# Patient Record
Sex: FEMALE | Race: OTHER | HISPANIC OR LATINO
[De-identification: names, ages, dates, MRNs, and addresses within clinical notes are randomized per-mention and may not be internally consistent; named-entity substitution may affect disease eponyms.]

---

## 2017-03-20 ENCOUNTER — APPOINTMENT (OUTPATIENT)
Dept: ORTHOPEDIC SURGERY | Facility: CLINIC | Age: 57
End: 2017-03-20

## 2017-03-20 PROBLEM — M25.511 BILATERAL SHOULDER PAIN: Status: ACTIVE | Noted: 2017-03-20

## 2017-03-27 ENCOUNTER — APPOINTMENT (OUTPATIENT)
Dept: ORTHOPEDIC SURGERY | Facility: CLINIC | Age: 57
End: 2017-03-27

## 2017-03-27 DIAGNOSIS — M25.511 PAIN IN RIGHT SHOULDER: ICD-10-CM

## 2017-03-27 DIAGNOSIS — M25.512 PAIN IN RIGHT SHOULDER: ICD-10-CM

## 2019-08-14 VITALS
BODY MASS INDEX: 28.12 KG/M2 | DIASTOLIC BLOOD PRESSURE: 68 MMHG | TEMPERATURE: 98.6 F | WEIGHT: 144 LBS | OXYGEN SATURATION: 97 % | RESPIRATION RATE: 16 BRPM | HEART RATE: 64 BPM | SYSTOLIC BLOOD PRESSURE: 132 MMHG

## 2020-12-21 ENCOUNTER — EMERGENCY (EMERGENCY)
Facility: HOSPITAL | Age: 60
LOS: 1 days | Discharge: HOME | End: 2020-12-21
Attending: STUDENT IN AN ORGANIZED HEALTH CARE EDUCATION/TRAINING PROGRAM
Payer: MEDICARE

## 2020-12-21 VITALS
HEIGHT: 61.5 IN | OXYGEN SATURATION: 95 % | HEART RATE: 69 BPM | DIASTOLIC BLOOD PRESSURE: 65 MMHG | TEMPERATURE: 98 F | SYSTOLIC BLOOD PRESSURE: 120 MMHG | RESPIRATION RATE: 18 BRPM

## 2020-12-21 DIAGNOSIS — Z98.89 OTHER SPECIFIED POSTPROCEDURAL STATES: Chronic | ICD-10-CM

## 2020-12-21 DIAGNOSIS — Z96.659 PRESENCE OF UNSPECIFIED ARTIFICIAL KNEE JOINT: Chronic | ICD-10-CM

## 2020-12-21 PROCEDURE — 70450 CT HEAD/BRAIN W/O DYE: CPT

## 2020-12-21 PROCEDURE — 90471 IMMUNIZATION ADMIN: CPT

## 2020-12-21 PROCEDURE — 12011 RPR F/E/E/N/L/M 2.5 CM/<: CPT

## 2020-12-21 PROCEDURE — 90715 TDAP VACCINE 7 YRS/> IM: CPT

## 2020-12-21 PROCEDURE — 99284 EMERGENCY DEPT VISIT MOD MDM: CPT | Mod: 25

## 2020-12-21 PROCEDURE — 70450 CT HEAD/BRAIN W/O DYE: CPT | Mod: 26

## 2020-12-21 RX ORDER — ACETAMINOPHEN 500 MG
650 TABLET ORAL ONCE
Refills: 0 | Status: COMPLETED | OUTPATIENT
Start: 2020-12-21 | End: 2020-12-21

## 2020-12-21 RX ORDER — TETANUS TOXOID, REDUCED DIPHTHERIA TOXOID AND ACELLULAR PERTUSSIS VACCINE, ADSORBED 5; 2.5; 8; 8; 2.5 [IU]/.5ML; [IU]/.5ML; UG/.5ML; UG/.5ML; UG/.5ML
0.5 SUSPENSION INTRAMUSCULAR ONCE
Refills: 0 | Status: COMPLETED | OUTPATIENT
Start: 2020-12-21 | End: 2020-12-21

## 2020-12-21 RX ADMIN — TETANUS TOXOID, REDUCED DIPHTHERIA TOXOID AND ACELLULAR PERTUSSIS VACCINE, ADSORBED 0.5 MILLILITER(S): 5; 2.5; 8; 8; 2.5 SUSPENSION INTRAMUSCULAR at 00:54

## 2020-12-21 RX ADMIN — Medication 650 MILLIGRAM(S): at 00:54

## 2020-12-21 NOTE — ED PROVIDER NOTE - PMH
Depression    High cholesterol    Hypertension    Migraines    RA (rheumatoid arthritis)    Reflux

## 2020-12-21 NOTE — ED ADULT TRIAGE NOTE - CHIEF COMPLAINT QUOTE
PT BIBA for mechanical fall. PT states fell down one step hit head, Small abrasion above left eye. Bleeding controlled. NO LOC no blood thinners. MD Rutherford called to evaluate

## 2020-12-21 NOTE — ED PROVIDER NOTE - OBJECTIVE STATEMENT
59yo female with pmh of HTN, HLD, migraines, RA presents s/p mechanical fall. Pt states she was walking down the stairs, missed the last step by mistake and fell onto her knees and hit her head, no loc. Pt states occurred at 8pm, she waited for her daugther to get home to evaluate her and that is when EMS was called. Pt denies any preceding symptoms to fall. Pt denies fevers/chills, loc, focal neuro deficits, cp/sob/palp, cough, abd pain/n/v/d, urinary symptoms, recent travel and sick contacts. Pt not on asa or AC. 61yo female with pmh of HTN, HLD, migraines, RA presents s/p mechanical fall. Pt states she was walking down the stairs, missed the last step by mistake and fell onto her knees and hit her head, no loc. Pt states occurred at 8pm, she waited for her daugther to get home to evaluate her and that is when EMS was called. Pt denies any preceding symptoms to fall. Pt denies fevers/chills, loc, focal neuro deficits, cp/sob/palp, cough, abd pain/n/v/d, urinary symptoms, recent travel and sick contacts. Pt not on asa or AC. tetanus not uptodate

## 2020-12-21 NOTE — ED ADULT NURSE NOTE - OBJECTIVE STATEMENT
pt arrived s/p fall at [facility]. fell forward down 1 step hitting head. small left forehead lac noted. bleeding controlled with gauze. denies LOC, denies blood thinner use. pt is aao x4. moving all extremities with equal strength, respirations even and unlabored. skin color wnl warm and dry. c/o headache and some dizziness. denies nausea, vomiting. denies injuries or pain any where else. no obvious deformities noted.

## 2020-12-21 NOTE — ED PROVIDER NOTE - NSFOLLOWUPINSTRUCTIONS_ED_ALL_ED_FT
Lesión en la joy, en adultos  Head Injury, Adult    Hay muchos tipos de lesiones en la joy. Pueden ser tan leves radha un chichón o pueden ser más graves. Algunas de las lesiones graves en la joy son:    Lesión que provoque un impacto en el cerebro (conmoción cerebral).  Hematoma en el cerebro (contusión). [place] significa que hay hemorragia en el cerebro que puede causar un edema.  Fisura en el cráneo (fractura de cráneo).  Hemorragia en el cerebro que se acumula, se coagula y forma ajit protuberancia (hematoma).    Después de ajit lesión en la joy, es posible que deba estar en observación marleny un tiempo en el servicio de emergencias. También puede ser necesario hospitalizarlo.    Después de que ocurre ajit lesión en la joy, la mayoría de los problemas ocurre dentro de las primeras 24 horas, aixa los efectos secundarios pueden aparecer entre 7 y 10 días después de la lesión. Es importante controlar martins afección para martir si hay cambios.     ¿Cuáles son las causas?  Hay muchas causas posibles de ajit lesión en la joy. Ajit lesión grave en la joy puede ocurrirle a alguien que tuvo un accidente automovilístico (colisión en un vehículo de motor). Otras causas de lesiones importantes en la joy incluyen accidentes en bicicleta o motocicleta, lesiones deportivas y caídas.    Factores de riesgo  Es más probable que esta afección se manifiesta en personas que:    Beben mucho alcohol o usan drogas ilícitas.  Tienen más de 65 años de edad.  Están en riesgo de sufrir caídas.    ¿Cuáles son los síntomas?  Hay muchos síntomas posibles de ajit lesión en la joy. Los síntomas visibles incluyen un simon un chichón o ajit hemorragia en el lugar de la lesión. Otros síntomas no visibles incluyen:    Somnolencia o no poder mantenerse despierto.  Desmayo.  Dolor de joy.  Convulsiones.  Mareos.  Confusión.  Problemas de memoria.  Náuseas o vómitos.    Otros síntomas posibles que pueden aparecer después de la lesión en la joy incluyen los siguientes:    Falta de atención y concentración.  Fatiga o cansarse fácilmente.  Irritabilidad.  Sentir incomodidad cerca de luces brillantes o ruidos laura.  Ansiedad o depresión.  Trastornos del sueño.    ¿Cómo se diagnostica?  Esta afección suele diagnosticarse en función de los síntomas, de ajit descripción de la lesión y de un examen físico. También pueden hacerle estudios de diagnóstico por imágenes, radha ajit resonancia magnética (RM) o ajit exploración por tomografía computarizada (TC). Lo controlarán estrictamente.    ¿Cómo se trata?  El tratamiento de esta afección depende de la gravedad y el tipo de lesión que sufrió. El objetivo principal del tratamiento es prevenir complicaciones y darle tiempo al cerebro para que se recupere.    En el silvia de ajit lesión leve en la joy, es posible que lo envíen a casa, y el tratamiento puede incluir lo siguiente:    Observación. Un adulto responsable debe quedarse con usted marleny 24 horas después de producida la lesión y controlarlo con frecuencia.  [place] físico.  [place] cerebral.  Analgésicos.    En el silvia de ajit lesión grave en la joy, el tratamiento puede incluir lo siguiente:    Observación minuciosa. [place] incluye hospitalización con exámenes físicos frecuentes. Puede necesitar ir a un hospital que se especialice en lesiones en la joy.  Analgésicos.  Asistencia respiratoria. [place] puede incluir un respirador.  Control de la presión dentro del cerebro (presión intracraneal o PIC). Estos pueden incluir los siguientes:  Monitoreo de la PIC.  Administrar medicamentos para disminuir la PIC.  Cambiar martins posición para disminuir la PIC.  Medicamentos para prevenir convulsiones.  Cirugía para detener la hemorragia o extraer coágulos de abdulkadir (craneotomía).  Cirugía para extraer parte del cráneo (craneotomía descompresiva). [place] permite que el cerebro tenga lugar para hincharse.    Siga estas indicaciones en martins casa:  Actividad    Descanse todo lo posible y evite actividades que sukhi físicamente difíciles o agotadoras.  Asegúrese de dormir lo suficiente.  Limite las actividades que requieran pensar mucho o prestar atención, radha las siguientes:  Mirar televisión.  Jugar juegos de memoria y armar rompecabezas.  Tareas para el hogar o trabajos relacionados con el empleo.  Trabajar en la computadora, participar en redes sociales y enviar mensajes de texto.  Evite actividades que puedan causar otra lesión en la joy, radha practicar deportes, hasta que el médico lo autorice. Puede ser peligroso tener otra lesión en la joy antes de que se haya recuperado de la primera.  Pregúntele al médico cuándo puede retomar saad actividades habituales, incluidos el trabajo o el estudio. Pídale al médico un plan escalonado para retomar saad actividades de forma gradual.  Consulte a martins médico sobre cuándo puede conducir, andar en bicicleta o usar maquinaria pesada. La capacidad para reaccionar puede ser más lenta luego de ajit lesión cerebral. Nunca realice estas actividades si se siente mareado.    Estilo de iliana    No xochitl alcohol hasta que el médico lo autorice y evite el consumo de drogas. El alcohol y ciertas drogas pueden demorar martins recuperación y ponerlo en riesgo de nuevas lesiones.  Si le resulta más difícil que lo habitual recordar las cosas, escríbalas.  Trate de hacer ajit cosa por vez si se distrae con facilidad.  Consulte con familiares y amigos si debe lucy decisiones importantes.  Cuénteles sobre martins lesión, los síntomas y las restricciones a saad amigos, a martins roni, a un colega de confianza y a martins gerente en el trabajo. Pídales que observen si aparecen nuevos problemas o empeoran los existentes.    Instrucciones generales    Racetrack los medicamentos de venta lele y los recetados solamente radha se lo haya indicado el médico.  Pídale a alguien que lo acompañe marleny 24 horas después de la lesión en la joy. Esta persona debe observar cambios en los síntomas y estar preparada para obtener ayuda médica, si es necesario.  Concurra a todas las visitas de control radha se lo haya indicado el médico. [place] es importante.    ¿Cómo se glenn?  Trabaje para mejorar el equilibrio y la fuerza a fin de evitar caídas.  Use el cinturón de seguridad cuando se encuentre en un vehículo en movimiento.  Use un etelvina al andar en bicicleta, esquiar o practicar cualquier otro deporte o actividad que tenga riesgo de lesiones.  Xochitl alcohol solo con moderación.  Racetrack medidas de seguridad en martins hogar, por ejemplo:  Mantenga los pisos y las escaleras en orden.  Coloque barras para sostén en los david y pasamanos en las escaleras.  Ponga alfombras antideslizantes en pisos y bañeras.  Mejore la iluminación en zonas de penumbra.    Solicite ayuda de inmediato si:  Tiene los siguientes síntomas:  Dolor de joy intenso que no desaparece con los medicamentos.  Dificultad para caminar, debilidad en los brazos o las piernas, o pérdida del equilibrio.  Secreción transparente o con abdulkadir que proviene de la nariz o de los oídos.  Cambios en la visión.  Convulsiones.  Vomita.  Los síntomas empeoran.  Arrastra las palabras.  Se desmaya.  Está más somnoliento o tiene dificultad para mantenerse despierto.  Las pupilas cambian de tamaño.    Estos síntomas pueden representar un problema grave que constituye ajit emergencia. No espere hasta que los síntomas desaparezcan. Solicite atención médica de inmediato. Comuníquese con el servicio de emergencias de martins localidad (911 en los [place]). No conduzca por saad propios medios hasta el hospital.    Por favor tenga seguimiento con martins doctor primario entre 2 rosenbaum.  Regrese a urgencias por cualquier preocupacion medica.

## 2020-12-21 NOTE — ED PROVIDER NOTE - PATIENT PORTAL LINK FT
You can access the FollowMyHealth Patient Portal offered by [facility] by registering at the following website: [email or web address]. By joining [facility]’s FollowMyHealth portal, you will also be able to view your health information using other applications (apps) compatible with our system.

## 2020-12-21 NOTE — ED PROVIDER NOTE - NS ED ROS FT
Review of Systems:  	•	CONSTITUTIONAL - no fever, no diaphoresis, no weight change  	•	SKIN - no rash  	•	HEMATOLOGIC - no bleeding, no bruising  	•	EYES - no eye pain, no blurred vision  	•	ENT - no change in hearing, no pain  	•	RESPIRATORY - no shortness of breath, no cough  	•	CARDIAC - no chest pain, no palpitations  	•	GI - no abd pain, no nausea, no vomiting, no diarrhea, no constipation, no bleeding  	•	GENITO-URINARY - no vaginal bleeding, no discharge, no dysuria; no hematuria  	•	ENDO - no polydypsia, no polyurea, no heat/no cold intolerance  	•	MUSCULOSKELETAL - no joint pain, no swelling, no redness  	•	NEUROLOGIC - no weakness, +headache, no anesthesia, no paresthesias  	•	PSYCH - no anxiety, non suicidal, non homicidal, no hallucination, no depression  	•	ALLERGY - no rhinitis

## 2020-12-21 NOTE — ED PROVIDER NOTE - CLINICAL SUMMARY MEDICAL DECISION MAKING FREE TEXT BOX
61yo female with pmh of HTN, HLD, migraines, RA presents s/p mechanical fall. 59yo female with pmh of HTN, HLD, migraines, RA presents s/p mechanical fall with lac to forehead, repair lac, CTH no intracranial traumatic injuries, adacel pt at neuro baseline, stable for dc with follow up

## 2020-12-21 NOTE — ED ADULT NURSE NOTE - CHPI ED NUR SYMPTOMS NEG
no confusion/no deformity/no fever/no loss of consciousness/no numbness/no tingling/no vomiting/no weakness

## 2020-12-21 NOTE — ED PROVIDER NOTE - PHYSICAL EXAMINATION
Const: Awake, alert and oriented. In no acute distress. Well appearing.  HEENT: hematoma to the left forehead with 1cm laceration to the upper left eyebrow. Moist mucous membranes.  Eyes: No scleral icterus. EOMI.  Neck:. Soft and supple. Full ROM without pain.  Cardiac: Regular rate and regular rhythm. +S1/S2. Peripheral pulses 2+ and symmetric. No LE edema.  Resp: Speaking in full sentences. No evidence of respiratory distress. No wheezes, rales or rhonchi.  Abd: Soft, non-tender, non-distended. Normal bowel sounds in all 4 quadrants. No guarding or rebound.  Back: Spine midline and non-tender. No CVAT.  Skin: No rashes, abrasions or lacerations.  Lymph: No cervical lymphadenopathy.  Neuro: Awake, alert & oriented x 3. Moves all extremities symmetrically. follows commands, motor abdirahman upper and lower ext 5/5, sensory symm and intact CN 2-12 grossly intact, no ataxia, no nystagmus, no dysmetria, no ddk, symm abdirahman, no pronator drift Const: Awake, alert and oriented. In no acute distress. Well appearing.  HEENT: hematoma to the left forehead with 1.5cm laceration to the upper left eyebrow. Moist mucous membranes.  Eyes: No scleral icterus. EOMI, no evidence of entrapment. mild periorbital ecchymosis  Neck:. Soft and supple. Full ROM without pain.  Cardiac: Regular rate and regular rhythm. +S1/S2. Peripheral pulses 2+ and symmetric. No LE edema.  Resp: Speaking in full sentences. No evidence of respiratory distress. No wheezes, rales or rhonchi.  Abd: Soft, non-tender, non-distended. Normal bowel sounds in all 4 quadrants. No guarding or rebound.  Back: Spine midline and non-tender. No CVAT.  Skin: No rashes, abrasions or lacerations.  Lymph: No cervical lymphadenopathy.  Neuro: Awake, alert & oriented x 3. Moves all extremities symmetrically. follows commands, motor abdirahman upper and lower ext 5/5, sensory symm and intact CN 2-12 grossly intact, no ataxia, no nystagmus, no dysmetria, no ddk, symm abdirahman, no pronator drift

## 2021-03-01 ENCOUNTER — OUTPATIENT (OUTPATIENT)
Dept: OUTPATIENT SERVICES | Facility: HOSPITAL | Age: 61
LOS: 1 days | End: 2021-03-01
Payer: MEDICARE

## 2021-03-01 DIAGNOSIS — Z96.659 PRESENCE OF UNSPECIFIED ARTIFICIAL KNEE JOINT: Chronic | ICD-10-CM

## 2021-03-01 DIAGNOSIS — Z98.89 OTHER SPECIFIED POSTPROCEDURAL STATES: Chronic | ICD-10-CM

## 2021-03-01 DIAGNOSIS — Z20.828 CONTACT WITH AND (SUSPECTED) EXPOSURE TO OTHER VIRAL COMMUNICABLE DISEASES: ICD-10-CM

## 2021-03-01 LAB — SARS-COV-2 RNA SPEC QL NAA+PROBE: SIGNIFICANT CHANGE UP

## 2021-03-01 PROCEDURE — C9803: CPT

## 2021-03-01 PROCEDURE — U0005: CPT

## 2021-03-01 PROCEDURE — U0003: CPT

## 2021-03-02 DIAGNOSIS — Z20.828 CONTACT WITH AND (SUSPECTED) EXPOSURE TO OTHER VIRAL COMMUNICABLE DISEASES: ICD-10-CM

## 2023-06-27 ENCOUNTER — NON-APPOINTMENT (OUTPATIENT)
Age: 63
End: 2023-06-27

## 2023-06-27 DIAGNOSIS — Z78.9 OTHER LONG TERM (CURRENT) DRUG THERAPY: ICD-10-CM

## 2023-06-27 DIAGNOSIS — Z82.49 FAMILY HISTORY OF ISCHEMIC HEART DISEASE AND OTHER DISEASES OF THE CIRCULATORY SYSTEM: ICD-10-CM

## 2023-06-27 DIAGNOSIS — Z85.41 PERSONAL HISTORY OF MALIGNANT NEOPLASM OF CERVIX UTERI: ICD-10-CM

## 2023-06-27 DIAGNOSIS — F41.8 OTHER SPECIFIED ANXIETY DISORDERS: ICD-10-CM

## 2023-06-27 DIAGNOSIS — Z82.5 FAMILY HISTORY OF ASTHMA AND OTHER CHRONIC LOWER RESPIRATORY DISEASES: ICD-10-CM

## 2023-06-27 DIAGNOSIS — Z87.2 PERSONAL HISTORY OF DISEASES OF THE SKIN AND SUBCUTANEOUS TISSUE: ICD-10-CM

## 2023-06-27 DIAGNOSIS — Z79.899 OTHER LONG TERM (CURRENT) DRUG THERAPY: ICD-10-CM

## 2023-11-27 ENCOUNTER — APPOINTMENT (OUTPATIENT)
Dept: RHEUMATOLOGY | Facility: CLINIC | Age: 63
End: 2023-11-27

## 2024-04-15 NOTE — ED ADULT NURSE NOTE - NEURO ASSESSMENT
-- DO NOT REPLY / DO NOT REPLY ALL --  -- Message is from [facility] [employer] ([facility]) --    General Patient Message: Patient is calling stating she would like to reschedule her anti coag appointment from Wednesday to today. Please call to discuss.        Caller Information         Type Contact Phone/Fax    04/15/2024 08:56 AM CDT Phone (Incoming) Bernie Dominguez (Self) [phone] (M)          Alternative phone number:     Can a detailed message be left? Yes    Message Turnaround:                - - -

## 2024-05-15 ENCOUNTER — APPOINTMENT (OUTPATIENT)
Dept: RHEUMATOLOGY | Facility: CLINIC | Age: 64
End: 2024-05-15